# Patient Record
Sex: MALE | Race: WHITE | NOT HISPANIC OR LATINO | Employment: FULL TIME | URBAN - METROPOLITAN AREA
[De-identification: names, ages, dates, MRNs, and addresses within clinical notes are randomized per-mention and may not be internally consistent; named-entity substitution may affect disease eponyms.]

---

## 2024-12-31 ENCOUNTER — HOSPITAL ENCOUNTER (EMERGENCY)
Facility: HOSPITAL | Age: 24
Discharge: HOME | End: 2024-12-31
Attending: EMERGENCY MEDICINE
Payer: COMMERCIAL

## 2024-12-31 ENCOUNTER — APPOINTMENT (OUTPATIENT)
Dept: CARDIOLOGY | Facility: HOSPITAL | Age: 24
End: 2024-12-31
Payer: COMMERCIAL

## 2024-12-31 ENCOUNTER — APPOINTMENT (OUTPATIENT)
Dept: VASCULAR MEDICINE | Facility: HOSPITAL | Age: 24
End: 2024-12-31
Payer: COMMERCIAL

## 2024-12-31 VITALS
HEART RATE: 97 BPM | DIASTOLIC BLOOD PRESSURE: 73 MMHG | HEIGHT: 66 IN | RESPIRATION RATE: 17 BRPM | OXYGEN SATURATION: 96 % | WEIGHT: 190 LBS | BODY MASS INDEX: 30.53 KG/M2 | TEMPERATURE: 98.1 F | SYSTOLIC BLOOD PRESSURE: 118 MMHG

## 2024-12-31 DIAGNOSIS — Z94.1 HISTORY OF HEART TRANSPLANT: ICD-10-CM

## 2024-12-31 DIAGNOSIS — Z95.1 HISTORY OF CORONARY ARTERY BYPASS GRAFT X 1: ICD-10-CM

## 2024-12-31 DIAGNOSIS — R60.0 LOCALIZED EDEMA: ICD-10-CM

## 2024-12-31 DIAGNOSIS — M79.89 LEFT LEG SWELLING: Primary | ICD-10-CM

## 2024-12-31 DIAGNOSIS — N17.9 AKI (ACUTE KIDNEY INJURY) (CMS-HCC): ICD-10-CM

## 2024-12-31 LAB
ALBUMIN SERPL BCP-MCNC: 4.1 G/DL (ref 3.4–5)
ALP SERPL-CCNC: 63 U/L (ref 33–120)
ALT SERPL W P-5'-P-CCNC: 10 U/L (ref 10–52)
ANION GAP SERPL CALC-SCNC: 16 MMOL/L (ref 10–20)
APTT PPP: 35 SECONDS (ref 27–38)
AST SERPL W P-5'-P-CCNC: 13 U/L (ref 9–39)
BASOPHILS # BLD AUTO: 0.03 X10*3/UL (ref 0–0.1)
BASOPHILS NFR BLD AUTO: 0.4 %
BILIRUB SERPL-MCNC: 1.4 MG/DL (ref 0–1.2)
BNP SERPL-MCNC: 504 PG/ML (ref 0–99)
BUN SERPL-MCNC: 42 MG/DL (ref 6–23)
CALCIUM SERPL-MCNC: 9.7 MG/DL (ref 8.6–10.3)
CARDIAC TROPONIN I PNL SERPL HS: 16 NG/L (ref 0–20)
CARDIAC TROPONIN I PNL SERPL HS: 18 NG/L (ref 0–20)
CHLORIDE SERPL-SCNC: 96 MMOL/L (ref 98–107)
CO2 SERPL-SCNC: 31 MMOL/L (ref 21–32)
CREAT SERPL-MCNC: 2.89 MG/DL (ref 0.5–1.3)
EGFRCR SERPLBLD CKD-EPI 2021: 31 ML/MIN/1.73M*2
EOSINOPHIL # BLD AUTO: 0.11 X10*3/UL (ref 0–0.7)
EOSINOPHIL NFR BLD AUTO: 1.6 %
ERYTHROCYTE [DISTWIDTH] IN BLOOD BY AUTOMATED COUNT: 16.4 % (ref 11.5–14.5)
GLUCOSE SERPL-MCNC: 101 MG/DL (ref 74–99)
HCT VFR BLD AUTO: 33 % (ref 41–52)
HGB BLD-MCNC: 10.1 G/DL (ref 13.5–17.5)
IMM GRANULOCYTES # BLD AUTO: 0.02 X10*3/UL (ref 0–0.7)
IMM GRANULOCYTES NFR BLD AUTO: 0.3 % (ref 0–0.9)
INR PPP: 1.4 (ref 0.9–1.1)
LYMPHOCYTES # BLD AUTO: 0.78 X10*3/UL (ref 1.2–4.8)
LYMPHOCYTES NFR BLD AUTO: 11.1 %
MAGNESIUM SERPL-MCNC: 1.63 MG/DL (ref 1.6–2.4)
MCH RBC QN AUTO: 26.4 PG (ref 26–34)
MCHC RBC AUTO-ENTMCNC: 30.6 G/DL (ref 32–36)
MCV RBC AUTO: 86 FL (ref 80–100)
MONOCYTES # BLD AUTO: 0.74 X10*3/UL (ref 0.1–1)
MONOCYTES NFR BLD AUTO: 10.6 %
NEUTROPHILS # BLD AUTO: 5.33 X10*3/UL (ref 1.2–7.7)
NEUTROPHILS NFR BLD AUTO: 76 %
NRBC BLD-RTO: 0 /100 WBCS (ref 0–0)
PLATELET # BLD AUTO: 221 X10*3/UL (ref 150–450)
POTASSIUM SERPL-SCNC: 3.7 MMOL/L (ref 3.5–5.3)
PROT SERPL-MCNC: 6.9 G/DL (ref 6.4–8.2)
PROTHROMBIN TIME: 15.5 SECONDS (ref 9.8–12.8)
RBC # BLD AUTO: 3.83 X10*6/UL (ref 4.5–5.9)
SODIUM SERPL-SCNC: 139 MMOL/L (ref 136–145)
WBC # BLD AUTO: 7 X10*3/UL (ref 4.4–11.3)

## 2024-12-31 PROCEDURE — 83880 ASSAY OF NATRIURETIC PEPTIDE: CPT | Performed by: EMERGENCY MEDICINE

## 2024-12-31 PROCEDURE — 85025 COMPLETE CBC W/AUTO DIFF WBC: CPT | Performed by: EMERGENCY MEDICINE

## 2024-12-31 PROCEDURE — 36415 COLL VENOUS BLD VENIPUNCTURE: CPT | Performed by: EMERGENCY MEDICINE

## 2024-12-31 PROCEDURE — 2500000001 HC RX 250 WO HCPCS SELF ADMINISTERED DRUGS (ALT 637 FOR MEDICARE OP): Performed by: EMERGENCY MEDICINE

## 2024-12-31 PROCEDURE — 93005 ELECTROCARDIOGRAM TRACING: CPT

## 2024-12-31 PROCEDURE — 99285 EMERGENCY DEPT VISIT HI MDM: CPT | Mod: 25 | Performed by: EMERGENCY MEDICINE

## 2024-12-31 PROCEDURE — 93971 EXTREMITY STUDY: CPT | Performed by: INTERNAL MEDICINE

## 2024-12-31 PROCEDURE — 83735 ASSAY OF MAGNESIUM: CPT | Performed by: EMERGENCY MEDICINE

## 2024-12-31 PROCEDURE — 93971 EXTREMITY STUDY: CPT

## 2024-12-31 PROCEDURE — 85730 THROMBOPLASTIN TIME PARTIAL: CPT | Performed by: EMERGENCY MEDICINE

## 2024-12-31 PROCEDURE — 84484 ASSAY OF TROPONIN QUANT: CPT | Performed by: EMERGENCY MEDICINE

## 2024-12-31 PROCEDURE — 85610 PROTHROMBIN TIME: CPT | Performed by: EMERGENCY MEDICINE

## 2024-12-31 PROCEDURE — 80053 COMPREHEN METABOLIC PANEL: CPT | Performed by: EMERGENCY MEDICINE

## 2024-12-31 RX ORDER — NITROGLYCERIN 0.4 MG/1
1 TABLET SUBLINGUAL EVERY 5 MIN PRN
COMMUNITY
Start: 2024-11-18 | End: 2025-11-18

## 2024-12-31 RX ORDER — LANOLIN ALCOHOL/MO/W.PET/CERES
400 CREAM (GRAM) TOPICAL 2 TIMES DAILY
COMMUNITY
Start: 2024-12-26

## 2024-12-31 RX ORDER — MYCOPHENOLIC ACID 180 MG/1
540 TABLET, DELAYED RELEASE ORAL 2 TIMES DAILY
COMMUNITY
Start: 2024-11-10

## 2024-12-31 RX ORDER — PREDNISONE 5 MG/1
5 TABLET ORAL
COMMUNITY
Start: 2024-12-12

## 2024-12-31 RX ORDER — METOPROLOL SUCCINATE 25 MG/1
25 TABLET, EXTENDED RELEASE ORAL
COMMUNITY
Start: 2024-04-18 | End: 2025-04-18

## 2024-12-31 RX ORDER — OXYCODONE HYDROCHLORIDE 5 MG/1
5 TABLET ORAL ONCE
Status: COMPLETED | OUTPATIENT
Start: 2024-12-31 | End: 2024-12-31

## 2024-12-31 RX ORDER — ROSUVASTATIN CALCIUM 10 MG/1
1 TABLET, COATED ORAL NIGHTLY
COMMUNITY
Start: 2024-12-27

## 2024-12-31 RX ORDER — FUROSEMIDE 80 MG/1
80 TABLET ORAL 2 TIMES DAILY
COMMUNITY
Start: 2024-12-29 | End: 2025-01-28

## 2024-12-31 RX ORDER — PANTOPRAZOLE SODIUM 40 MG/1
40 TABLET, DELAYED RELEASE ORAL 2 TIMES DAILY
COMMUNITY
Start: 2023-01-27

## 2024-12-31 RX ORDER — ACETAMINOPHEN 500 MG
1000 TABLET ORAL EVERY 8 HOURS PRN
COMMUNITY

## 2024-12-31 RX ORDER — POTASSIUM CHLORIDE 20 MEQ/1
40 TABLET, EXTENDED RELEASE ORAL
COMMUNITY
Start: 2024-12-12

## 2024-12-31 RX ORDER — SULFAMETHOXAZOLE AND TRIMETHOPRIM 400; 80 MG/1; MG/1
2 TABLET ORAL
COMMUNITY
Start: 2024-02-26

## 2024-12-31 RX ORDER — FUROSEMIDE 40 MG/1
40 TABLET ORAL 2 TIMES DAILY
Qty: 30 TABLET | Refills: 0 | Status: SHIPPED | OUTPATIENT
Start: 2024-12-31 | End: 2025-01-15

## 2024-12-31 RX ORDER — NAPROXEN SODIUM 220 MG/1
81 TABLET, FILM COATED ORAL
COMMUNITY
Start: 2024-07-23

## 2024-12-31 RX ORDER — TACROLIMUS 1 MG/1
2 CAPSULE ORAL 2 TIMES DAILY
COMMUNITY
Start: 2024-12-26

## 2024-12-31 RX ADMIN — OXYCODONE HYDROCHLORIDE 5 MG: 5 TABLET ORAL at 11:54

## 2024-12-31 ASSESSMENT — PAIN SCALES - GENERAL: PAINLEVEL_OUTOF10: 4

## 2024-12-31 ASSESSMENT — PAIN DESCRIPTION - LOCATION: LOCATION: CHEST

## 2024-12-31 ASSESSMENT — PAIN - FUNCTIONAL ASSESSMENT: PAIN_FUNCTIONAL_ASSESSMENT: 0-10

## 2024-12-31 NOTE — ED TRIAGE NOTES
Pt presents to ED via EMS from home for left lower leg swelling. Pt has extensive cardiac surgical history including a bypass last month.

## 2024-12-31 NOTE — DISCHARGE INSTRUCTIONS
You were seen in the ED for worsening left leg swelling after recent cardiac surgery.  Ultrasound did not show any blood clot in your leg.  Labs showed mildly worsened kidney function.  Please cut your lasix dose to 40mg twice a day and follow up within 1 week with cardiac transplant/heart failure center and with your PCP.  You may benefit from repeat ultrasound in 1 week if your leg swelling does not improve.  Call your transplant team and/or return to the ED if you develop chest pain, trouble breathing or other concerns.

## 2024-12-31 NOTE — ED PROVIDER NOTES
HPI   Chief Complaint   Patient presents with    Leg Swelling       This is a 24yM complex cardiac history (congenital aortic stenosis s/p Ross procedure 2003 followed by CHF with pulm HTN requiring orthotopic heart transplant 2009 and recent redo CABG (LIMA-LAD) 11/26 with redo of anastamosis 11/27 complicated by low cardiac output requiring milrinone (weaned off) and fluid overload on IV lasix (discharged on 12/12 at patient request even though not fully diuresed).  Cardiologist Dr. Estevez.  He is awaiting follow up US and [facility] surgery consult for possible pseudoaneurysm and/or AV fistula of R CFA.  At his follow up with the transplant/heart failure team, he endorsed incisional chest pain.  He continues to note that pain and says he completed the prescribed course of norco and tylenol is insufficient.  Mostly, he is worried about his leg swelling.  He says his b/l leg swelling has improved in his RLE but his LLE is still swollen and has even worsened and it is now painful to walk.  No SOB, fever, cough.      History provided by:  Patient and medical records   used: No            Patient History   Past Medical History:   Diagnosis Date    Acute pharyngitis, unspecified 05/08/2017    Acute viral pharyngitis    Acute upper respiratory infection, unspecified 11/06/2017    Acute upper respiratory infection    Noninfective gastroenteritis and colitis, unspecified 02/15/2017    Gastroenteritis, acute    Personal history of other (healed) physical injury and trauma 08/11/2014    History of sprain of ankle    Personal history of other diseases of the respiratory system 11/09/2017    History of acute pharyngitis    Personal history of other diseases of the respiratory system 11/06/2017    History of sore throat    Personal history of other diseases of the respiratory system 09/29/2016    History of acute bronchitis    Personal history of other diseases of the respiratory system 03/17/2014    History of  streptococcal pharyngitis    Personal history of other specified conditions 07/02/2013    History of diarrhea    Personal history of other specified conditions 05/11/2017    History of nasal congestion    Plantar wart 07/13/2016    Plantar warts     No past surgical history on file.  No family history on file.  Social History     Tobacco Use    Smoking status: Not on file    Smokeless tobacco: Not on file   Substance Use Topics    Alcohol use: Not on file    Drug use: Not on file       Physical Exam   ED Triage Vitals [12/31/24 1114]   Temperature Heart Rate Respirations BP   36.7 °C (98.1 °F) 94 20 131/75      Pulse Ox Temp Source Heart Rate Source Patient Position   99 % Temporal Monitor Sitting      BP Location FiO2 (%)     Right arm --       Physical Exam  Vitals and nursing note reviewed.   Constitutional:       General: He is not in acute distress.     Appearance: He is not ill-appearing, toxic-appearing or diaphoretic.   HENT:      Head: Normocephalic and atraumatic.      Nose: Nose normal. No congestion or rhinorrhea.      Mouth/Throat:      Mouth: Mucous membranes are moist.   Eyes:      General: No scleral icterus.        Right eye: No discharge.         Left eye: No discharge.      Extraocular Movements: Extraocular movements intact.      Conjunctiva/sclera: Conjunctivae normal.   Cardiovascular:      Rate and Rhythm: Normal rate and regular rhythm.      Heart sounds: No murmur heard.     No friction rub. No gallop.      Comments: Well healing midline surgical incision  Pulmonary:      Effort: Pulmonary effort is normal. No respiratory distress.      Breath sounds: Normal breath sounds. No stridor. No wheezing, rhonchi or rales.   Abdominal:      Palpations: Abdomen is soft.      Tenderness: There is no abdominal tenderness. There is no guarding or rebound.   Musculoskeletal:         General: Swelling and tenderness present. No deformity or signs of injury. Normal range of motion.      Cervical back:  Normal range of motion and neck supple. No rigidity.      Left lower leg: Edema present.   Skin:     General: Skin is dry.      Coloration: Skin is not jaundiced or pale.      Findings: No bruising, erythema, lesion or rash.   Neurological:      General: No focal deficit present.      Mental Status: He is alert and oriented to person, place, and time.      Cranial Nerves: No cranial nerve deficit.      Sensory: No sensory deficit.      Motor: No weakness.   Psychiatric:         Mood and Affect: Mood normal.         Behavior: Behavior normal.           ED Course & MDM   ED Course as of 12/31/24 1454   Tue Dec 31, 2024   1231 EKG interpreted by me normal sinus rhythm normal axis otherwise normal intervals QTc 413 anterior T wave inversions similar to prior but new lateral and inferior T wave inversions compared to prior [EK]   1238 Labs reviewed, no leukocytosis, mild anemia hemoglobin 10.1, grossly normal electrolytes, CKD creatinine 2.89 up from recent 2.5-2.6 postop, mildly elevated  negative troponin 18 [EK]   1444 Spoke to [facility] cardiac transplant team - ok to dc given no acute issue, consider cutting dose of lasix now that he is back to his dry weight and has ASHA, recommend follow up with [facility] transplant center and PCP. [EK]      ED Course User Index  [EK] Elyse H Klerman, MD         Diagnoses as of 12/31/24 1454   Left leg swelling   History of heart transplant   History of coronary artery bypass graft x 1   ASHA (acute kidney injury) ([facility])                 No data recorded     Arturo Coma Scale Score: 15 (12/31/24 1115 : Johana Palma RN)                           Medical Decision Making  24yM complex cardiac history presents with worsening LLE swelling after recent CABG.  Pt nontoxic apperaing, hemodynamically stable and with no respiratory distress, hypoxia, tachypnea or inc WOB.  EKG with near diffuse TWI.  Labs with mild ASHA.  US without DVT in LLE.  Discussed with transplant team - recommend  outpatient surveillance with close follow up without need for admission and agree with cutting his lasix dose in half given return to his dry weight and ASHA.  Plan reviewed with pt and father at bedside who are agreeable to this plan.  Ok to dc with return precautions.    Amount and/or Complexity of Data Reviewed  Independent Historian: parent  External Data Reviewed: labs, ECG and notes.  Labs: ordered. Decision-making details documented in ED Course.  Radiology: ordered.  ECG/medicine tests: ordered and independent interpretation performed. Decision-making details documented in ED Course.    Risk  Prescription drug management.  Decision regarding hospitalization.        Procedure  Procedures     Elyse H Klerman, MD  12/31/24 5120

## 2025-01-03 LAB
ATRIAL RATE: 94 BPM
P AXIS: 56 DEGREES
PR INTERVAL: 173 MS
Q ONSET: 252 MS
QRS COUNT: 15 BEATS
QRS DURATION: 97 MS
QT INTERVAL: 330 MS
QTC CALCULATION(BAZETT): 413 MS
QTC FREDERICIA: 383 MS
R AXIS: 30 DEGREES
T AXIS: 196 DEGREES
T OFFSET: 417 MS
VENTRICULAR RATE: 94 BPM

## 2025-04-26 ENCOUNTER — HOSPITAL ENCOUNTER (EMERGENCY)
Facility: HOSPITAL | Age: 25
Discharge: ANOTHER HOSPITAL | End: 2025-04-27
Attending: EMERGENCY MEDICINE
Payer: COMMERCIAL

## 2025-04-26 ENCOUNTER — APPOINTMENT (OUTPATIENT)
Dept: RADIOLOGY | Facility: HOSPITAL | Age: 25
End: 2025-04-26
Payer: COMMERCIAL

## 2025-04-26 ENCOUNTER — HOSPITAL ENCOUNTER (OUTPATIENT)
Dept: CARDIOLOGY | Facility: HOSPITAL | Age: 25
Discharge: HOME | End: 2025-04-26
Payer: COMMERCIAL

## 2025-04-26 DIAGNOSIS — R07.9 CHEST PAIN: ICD-10-CM

## 2025-04-26 DIAGNOSIS — R07.9 CHEST PAIN, UNSPECIFIED TYPE: Primary | ICD-10-CM

## 2025-04-26 LAB
ANION GAP SERPL CALC-SCNC: 14 MMOL/L (ref 10–20)
BASOPHILS # BLD AUTO: 0.01 X10*3/UL (ref 0–0.1)
BASOPHILS NFR BLD AUTO: 0.1 %
BNP SERPL-MCNC: 248 PG/ML (ref 0–99)
BUN SERPL-MCNC: 26 MG/DL (ref 6–23)
CALCIUM SERPL-MCNC: 9.2 MG/DL (ref 8.6–10.3)
CARDIAC TROPONIN I PNL SERPL HS: 9 NG/L (ref 0–20)
CHLORIDE SERPL-SCNC: 101 MMOL/L (ref 98–107)
CO2 SERPL-SCNC: 26 MMOL/L (ref 21–32)
CREAT SERPL-MCNC: 2.51 MG/DL (ref 0.5–1.3)
EGFRCR SERPLBLD CKD-EPI 2021: 36 ML/MIN/1.73M*2
EOSINOPHIL # BLD AUTO: 0.1 X10*3/UL (ref 0–0.7)
EOSINOPHIL NFR BLD AUTO: 1.4 %
ERYTHROCYTE [DISTWIDTH] IN BLOOD BY AUTOMATED COUNT: 22.4 % (ref 11.5–14.5)
GLUCOSE SERPL-MCNC: 115 MG/DL (ref 74–99)
HCT VFR BLD AUTO: 33.2 % (ref 41–52)
HGB BLD-MCNC: 9.5 G/DL (ref 13.5–17.5)
HYPOCHROMIA BLD QL SMEAR: NORMAL
IMM GRANULOCYTES # BLD AUTO: 0.02 X10*3/UL (ref 0–0.7)
IMM GRANULOCYTES NFR BLD AUTO: 0.3 % (ref 0–0.9)
LYMPHOCYTES # BLD AUTO: 0.66 X10*3/UL (ref 1.2–4.8)
LYMPHOCYTES NFR BLD AUTO: 9.5 %
MAGNESIUM SERPL-MCNC: 1.68 MG/DL (ref 1.6–2.4)
MCH RBC QN AUTO: 22.5 PG (ref 26–34)
MCHC RBC AUTO-ENTMCNC: 28.6 G/DL (ref 32–36)
MCV RBC AUTO: 79 FL (ref 80–100)
MONOCYTES # BLD AUTO: 0.67 X10*3/UL (ref 0.1–1)
MONOCYTES NFR BLD AUTO: 9.6 %
NEUTROPHILS # BLD AUTO: 5.51 X10*3/UL (ref 1.2–7.7)
NEUTROPHILS NFR BLD AUTO: 79.1 %
NRBC BLD-RTO: 0 /100 WBCS (ref 0–0)
OVALOCYTES BLD QL SMEAR: NORMAL
PLATELET # BLD AUTO: 147 X10*3/UL (ref 150–450)
POLYCHROMASIA BLD QL SMEAR: NORMAL
POTASSIUM SERPL-SCNC: 4.4 MMOL/L (ref 3.5–5.3)
RBC # BLD AUTO: 4.23 X10*6/UL (ref 4.5–5.9)
RBC MORPH BLD: NORMAL
SODIUM SERPL-SCNC: 137 MMOL/L (ref 136–145)
WBC # BLD AUTO: 7 X10*3/UL (ref 4.4–11.3)

## 2025-04-26 PROCEDURE — 71046 X-RAY EXAM CHEST 2 VIEWS: CPT

## 2025-04-26 PROCEDURE — 83880 ASSAY OF NATRIURETIC PEPTIDE: CPT | Performed by: EMERGENCY MEDICINE

## 2025-04-26 PROCEDURE — 85025 COMPLETE CBC W/AUTO DIFF WBC: CPT | Performed by: EMERGENCY MEDICINE

## 2025-04-26 PROCEDURE — 83735 ASSAY OF MAGNESIUM: CPT | Performed by: EMERGENCY MEDICINE

## 2025-04-26 PROCEDURE — 84484 ASSAY OF TROPONIN QUANT: CPT | Performed by: EMERGENCY MEDICINE

## 2025-04-26 PROCEDURE — 96374 THER/PROPH/DIAG INJ IV PUSH: CPT | Mod: 59

## 2025-04-26 PROCEDURE — 2500000004 HC RX 250 GENERAL PHARMACY W/ HCPCS (ALT 636 FOR OP/ED): Mod: JZ | Performed by: EMERGENCY MEDICINE

## 2025-04-26 PROCEDURE — 36415 COLL VENOUS BLD VENIPUNCTURE: CPT | Performed by: EMERGENCY MEDICINE

## 2025-04-26 PROCEDURE — 71046 X-RAY EXAM CHEST 2 VIEWS: CPT | Performed by: RADIOLOGY

## 2025-04-26 PROCEDURE — 71275 CT ANGIOGRAPHY CHEST: CPT

## 2025-04-26 PROCEDURE — 99285 EMERGENCY DEPT VISIT HI MDM: CPT | Mod: 25 | Performed by: EMERGENCY MEDICINE

## 2025-04-26 PROCEDURE — 80048 BASIC METABOLIC PNL TOTAL CA: CPT | Performed by: EMERGENCY MEDICINE

## 2025-04-26 PROCEDURE — 74174 CTA ABD&PLVS W/CONTRAST: CPT

## 2025-04-26 RX ORDER — MORPHINE SULFATE 4 MG/ML
4 INJECTION, SOLUTION INTRAMUSCULAR; INTRAVENOUS ONCE
Status: COMPLETED | OUTPATIENT
Start: 2025-04-26 | End: 2025-04-26

## 2025-04-26 RX ADMIN — MORPHINE SULFATE 4 MG: 4 INJECTION, SOLUTION INTRAMUSCULAR; INTRAVENOUS at 22:30

## 2025-04-26 ASSESSMENT — COLUMBIA-SUICIDE SEVERITY RATING SCALE - C-SSRS
6. HAVE YOU EVER DONE ANYTHING, STARTED TO DO ANYTHING, OR PREPARED TO DO ANYTHING TO END YOUR LIFE?: NO
2. HAVE YOU ACTUALLY HAD ANY THOUGHTS OF KILLING YOURSELF?: NO
1. IN THE PAST MONTH, HAVE YOU WISHED YOU WERE DEAD OR WISHED YOU COULD GO TO SLEEP AND NOT WAKE UP?: NO

## 2025-04-26 ASSESSMENT — PAIN - FUNCTIONAL ASSESSMENT: PAIN_FUNCTIONAL_ASSESSMENT: 0-10

## 2025-04-26 ASSESSMENT — PAIN SCALES - GENERAL
PAINLEVEL_OUTOF10: 4
PAINLEVEL_OUTOF10: 4

## 2025-04-26 ASSESSMENT — PAIN DESCRIPTION - PROGRESSION: CLINICAL_PROGRESSION: RAPIDLY IMPROVING

## 2025-04-26 ASSESSMENT — PAIN DESCRIPTION - LOCATION: LOCATION: CHEST

## 2025-04-27 VITALS
BODY MASS INDEX: 28.12 KG/M2 | TEMPERATURE: 98.8 F | OXYGEN SATURATION: 94 % | RESPIRATION RATE: 24 BRPM | SYSTOLIC BLOOD PRESSURE: 134 MMHG | HEART RATE: 94 BPM | WEIGHT: 175 LBS | DIASTOLIC BLOOD PRESSURE: 68 MMHG | HEIGHT: 66 IN

## 2025-04-27 LAB
CARDIAC TROPONIN I PNL SERPL HS: 8 NG/L (ref 0–20)
LACTATE SERPL-SCNC: 1 MMOL/L (ref 0.4–2)
TACROLIMUS BLD-MCNC: 6.2 NG/ML

## 2025-04-27 PROCEDURE — 96375 TX/PRO/DX INJ NEW DRUG ADDON: CPT

## 2025-04-27 PROCEDURE — 2550000001 HC RX 255 CONTRASTS: Performed by: EMERGENCY MEDICINE

## 2025-04-27 PROCEDURE — 74174 CTA ABD&PLVS W/CONTRAST: CPT | Performed by: SURGERY

## 2025-04-27 PROCEDURE — 96376 TX/PRO/DX INJ SAME DRUG ADON: CPT

## 2025-04-27 PROCEDURE — 2500000004 HC RX 250 GENERAL PHARMACY W/ HCPCS (ALT 636 FOR OP/ED): Mod: JZ | Performed by: STUDENT IN AN ORGANIZED HEALTH CARE EDUCATION/TRAINING PROGRAM

## 2025-04-27 PROCEDURE — 83605 ASSAY OF LACTIC ACID: CPT | Performed by: EMERGENCY MEDICINE

## 2025-04-27 PROCEDURE — 2500000005 HC RX 250 GENERAL PHARMACY W/O HCPCS: Performed by: EMERGENCY MEDICINE

## 2025-04-27 PROCEDURE — 71275 CT ANGIOGRAPHY CHEST: CPT | Performed by: SURGERY

## 2025-04-27 PROCEDURE — 2500000001 HC RX 250 WO HCPCS SELF ADMINISTERED DRUGS (ALT 637 FOR MEDICARE OP): Performed by: EMERGENCY MEDICINE

## 2025-04-27 PROCEDURE — 36415 COLL VENOUS BLD VENIPUNCTURE: CPT | Performed by: EMERGENCY MEDICINE

## 2025-04-27 PROCEDURE — 80197 ASSAY OF TACROLIMUS: CPT | Mod: PARLAB | Performed by: EMERGENCY MEDICINE

## 2025-04-27 PROCEDURE — 2500000004 HC RX 250 GENERAL PHARMACY W/ HCPCS (ALT 636 FOR OP/ED): Performed by: EMERGENCY MEDICINE

## 2025-04-27 RX ORDER — PANTOPRAZOLE SODIUM 40 MG/1
40 TABLET, DELAYED RELEASE ORAL
Status: DISCONTINUED | OUTPATIENT
Start: 2025-04-27 | End: 2025-04-27 | Stop reason: HOSPADM

## 2025-04-27 RX ORDER — METOPROLOL SUCCINATE 25 MG/1
25 TABLET, EXTENDED RELEASE ORAL DAILY
Status: DISCONTINUED | OUTPATIENT
Start: 2025-04-27 | End: 2025-04-27 | Stop reason: HOSPADM

## 2025-04-27 RX ORDER — PREDNISONE 10 MG/1
5 TABLET ORAL DAILY
Status: DISCONTINUED | OUTPATIENT
Start: 2025-04-27 | End: 2025-04-27 | Stop reason: HOSPADM

## 2025-04-27 RX ORDER — ALLOPURINOL 100 MG/1
100 TABLET ORAL DAILY
Status: DISCONTINUED | OUTPATIENT
Start: 2025-04-27 | End: 2025-04-27 | Stop reason: HOSPADM

## 2025-04-27 RX ORDER — ROSUVASTATIN CALCIUM 10 MG/1
10 TABLET, COATED ORAL DAILY
Status: DISCONTINUED | OUTPATIENT
Start: 2025-04-27 | End: 2025-04-27 | Stop reason: HOSPADM

## 2025-04-27 RX ORDER — LIDOCAINE 560 MG/1
2 PATCH PERCUTANEOUS; TOPICAL; TRANSDERMAL ONCE
Status: DISCONTINUED | OUTPATIENT
Start: 2025-04-27 | End: 2025-04-27 | Stop reason: HOSPADM

## 2025-04-27 RX ORDER — MORPHINE SULFATE 4 MG/ML
4 INJECTION, SOLUTION INTRAMUSCULAR; INTRAVENOUS ONCE
Status: COMPLETED | OUTPATIENT
Start: 2025-04-27 | End: 2025-04-27

## 2025-04-27 RX ORDER — TORSEMIDE 20 MG/1
100 TABLET ORAL ONCE
Status: COMPLETED | OUTPATIENT
Start: 2025-04-27 | End: 2025-04-27

## 2025-04-27 RX ORDER — CYCLOBENZAPRINE HCL 10 MG
10 TABLET ORAL ONCE
Status: COMPLETED | OUTPATIENT
Start: 2025-04-27 | End: 2025-04-27

## 2025-04-27 RX ORDER — DULOXETIN HYDROCHLORIDE 20 MG/1
40 CAPSULE, DELAYED RELEASE ORAL DAILY
Status: DISCONTINUED | OUTPATIENT
Start: 2025-04-27 | End: 2025-04-27 | Stop reason: HOSPADM

## 2025-04-27 RX ORDER — MYCOPHENOLIC ACID 180 MG/1
180 TABLET, DELAYED RELEASE ORAL 2 TIMES DAILY
Status: DISCONTINUED | OUTPATIENT
Start: 2025-04-27 | End: 2025-04-27 | Stop reason: HOSPADM

## 2025-04-27 RX ORDER — NAPROXEN SODIUM 220 MG/1
81 TABLET, FILM COATED ORAL ONCE
Status: COMPLETED | OUTPATIENT
Start: 2025-04-27 | End: 2025-04-27

## 2025-04-27 RX ADMIN — TORSEMIDE 100 MG: 20 TABLET ORAL at 08:42

## 2025-04-27 RX ADMIN — DULOXETINE HYDROCHLORIDE 40 MG: 20 CAPSULE, DELAYED RELEASE ORAL at 08:42

## 2025-04-27 RX ADMIN — TACROLIMUS 1.5 MG: 1 CAPSULE ORAL at 06:26

## 2025-04-27 RX ADMIN — HYDROMORPHONE HYDROCHLORIDE 0.5 MG: 1 INJECTION, SOLUTION INTRAMUSCULAR; INTRAVENOUS; SUBCUTANEOUS at 08:42

## 2025-04-27 RX ADMIN — MYCOPHENOLIC ACID 180 MG: 180 TABLET, DELAYED RELEASE ORAL at 08:42

## 2025-04-27 RX ADMIN — LIDOCAINE 2 PATCH: 4 PATCH TOPICAL at 00:40

## 2025-04-27 RX ADMIN — ASPIRIN 81 MG CHEWABLE TABLET 81 MG: 81 TABLET CHEWABLE at 08:41

## 2025-04-27 RX ADMIN — CYCLOBENZAPRINE 10 MG: 10 TABLET, FILM COATED ORAL at 00:40

## 2025-04-27 RX ADMIN — METOPROLOL SUCCINATE 25 MG: 25 TABLET, EXTENDED RELEASE ORAL at 08:41

## 2025-04-27 RX ADMIN — ALLOPURINOL 100 MG: 100 TABLET ORAL at 08:42

## 2025-04-27 RX ADMIN — PANTOPRAZOLE SODIUM 40 MG: 40 TABLET, DELAYED RELEASE ORAL at 06:26

## 2025-04-27 RX ADMIN — MORPHINE SULFATE 4 MG: 4 INJECTION, SOLUTION INTRAMUSCULAR; INTRAVENOUS at 03:28

## 2025-04-27 RX ADMIN — IOHEXOL 100 ML: 350 INJECTION, SOLUTION INTRAVENOUS at 00:25

## 2025-04-27 RX ADMIN — PREDNISONE 5 MG: 10 TABLET ORAL at 08:41

## 2025-04-27 ASSESSMENT — PAIN DESCRIPTION - LOCATION: LOCATION: SHOULDER

## 2025-04-27 ASSESSMENT — PAIN DESCRIPTION - ORIENTATION: ORIENTATION: RIGHT

## 2025-04-27 ASSESSMENT — PAIN SCALES - GENERAL
PAINLEVEL_OUTOF10: 4
PAINLEVEL_OUTOF10: 10 - WORST POSSIBLE PAIN
PAINLEVEL_OUTOF10: 10 - WORST POSSIBLE PAIN

## 2025-04-27 ASSESSMENT — PAIN - FUNCTIONAL ASSESSMENT
PAIN_FUNCTIONAL_ASSESSMENT: 0-10

## 2025-04-27 NOTE — ED TRIAGE NOTES
Pt comes to the ED via EMS from home with complaints of chest pain. He does have a history of a heart transplant as well as cardiac stent x2 and bypass surgery.

## 2025-04-27 NOTE — ED PROVIDER NOTES
External Records Reviewed: Discharge summary from the [facility] and when she was submitted for initiation of dialysis in the setting of acute kidney injury on CKD    HPI  Jerzy Richard is a 24 y.o. male with a history of orthotopic heart transplant in 2006, coronary artery disease status post CABG in 2024 and PCI, end-stage renal disease on intermittent hemodialysis (MWF) presenting to the emergency department chest pain.  Patient reports that he developed chest pain, right shoulder, right back pain earlier today.  There is no inciting event.  He reports some shortness of breath and nausea.  He denies any vomiting.  He denies any falls or recent trauma.    [facility]  Medical History[1]    Meds  Current Outpatient Medications   Medication Instructions    acetaminophen (TYLENOL) 1,000 mg, oral, Every 8 hours PRN    aspirin 81 mg, oral, Daily RT    evolocumab (REPATHA SURECLICK) 140 mg, subcutaneous, Every 14 days    furosemide (LASIX) 80 mg, oral, 2 times daily    furosemide (LASIX) 40 mg, oral, 2 times daily    magnesium oxide (MAG-OX) 400 mg, oral, 2 times daily    metoprolol succinate XL (TOPROL-XL) 25 mg, oral, Daily RT    mycophenolate (MYFORTIC) 540 mg, oral, 2 times daily    nitroglycerin (Nitrostat) 0.4 mg SL tablet 1 tablet, sublingual, Every 5 min PRN,  Up to 3 doses. If no pain relief call 911.    pantoprazole (PROTONIX) 40 mg, oral, 2 times daily    potassium chloride CR 20 mEq ER tablet 40 mEq, oral, Daily RT    predniSONE (DELTASONE) 5 mg, oral, Daily RT    rosuvastatin (Crestor) 10 mg tablet 1 tablet, oral, Nightly    sulfamethoxazole-trimethoprim (Bactrim) 400-80 mg tablet 2 tablets, oral, Every Mon/Wed/Fri    tacrolimus (PROGRAF) 2 mg, oral, 2 times daily       Allergies  RX Allergies[2]     SHx  Social History[3]    ------------------------------------------------------------------------------------------------------------------------------------------    /69   Pulse 94   Temp 37.1 °C (98.8  "°F)   Resp 13   Ht 1.676 m (5' 6\")   Wt 79.4 kg (175 lb)   SpO2 95%   BMI 28.25 kg/m²     Physical Exam  Vitals and nursing note reviewed.   Constitutional:       General: He is not in acute distress.     Appearance: Normal appearance.   HENT:      Head: Normocephalic and atraumatic.      Right Ear: External ear normal.      Left Ear: External ear normal.   Eyes:      Extraocular Movements: Extraocular movements intact.      Conjunctiva/sclera: Conjunctivae normal.   Cardiovascular:      Rate and Rhythm: Normal rate and regular rhythm.      Pulses: Normal pulses.      Heart sounds: Normal heart sounds. No murmur heard.     No friction rub. No gallop.   Pulmonary:      Effort: Pulmonary effort is normal. No respiratory distress.      Breath sounds: No wheezing, rhonchi or rales.   Chest:      Comments: Well healed sternotomy incision  Abdominal:      General: There is no distension.      Palpations: Abdomen is soft.      Tenderness: There is no abdominal tenderness. There is no guarding or rebound.   Musculoskeletal:         General: No swelling. Normal range of motion.      Cervical back: Neck supple.      Right lower leg: No edema.      Left lower leg: No edema.   Skin:     General: Skin is warm and dry.   Neurological:      General: No focal deficit present.      Mental Status: He is alert and oriented to person, place, and time.   Psychiatric:         Mood and Affect: Mood normal.          ------------------------------------------------------------------------------------------------------------------------------------------    Medical Decision Making: This is a 24-year-old male with a complicated past medical history presenting to the emergency department concern for chest and back pain.  His vital signs are normal and stable.  On examination he has a normal cardiopulmonary exam.  At this time, the etiology of his symptoms is not entirely clear his EKG shows no acute change concerning for ischemia and his " troponins are stable so low concern for acute coronary syndrome.  His basic metabolic panel reveals a stable creatinine at 2.51.  He has no significant leukocytosis and his chest x-ray shows no evidence of pneumonia so have low concern for infectious etiologies of his symptoms.  He does have a chronic anemia with a hemoglobin of 9.5.  Given the pain of his chest, back, and flank, CT angiogram was performed of his chest, abdomen, pelvis.  This revealed no evidence of acute aortic pathology.  Additionally, there is no evidence of pneumothorax.  There was concern for potential mesenteric ischemia, however the patient has no abdominal pain and his lactate is 1 without any leukocytosis so I have very low concern that this is clinically present.  Given that he has a very complicated history including coronary disease with persistent chest pain, I did discuss the patient's case with the on-call cardiologist who accepted him for transfer to the [facility].  He will be monitored in our emergency department until he can be moved to his new treatment location.  While in the emergency department, he was given lidocaine, morphine, and Flexeril for his pain with mild improvement of his symptoms.      Independent Interpretations: Chest x-ray shows no pneumothorax or pneumonia    EKG interpreted by me:  ED Course as of 04/27/25 0314   Sat Apr 26, 2025 2233 EKG:  Rate 89  NSR  Normal axis  Normal intervals  Anterior TWI, similar to prior [AG]      ED Course User Index  [AG] Sterling Mckay MD         Diagnoses as of 04/27/25 0314   Chest pain, unspecified type       Discussed with: [facility] cardiology      Sterling Mckay MD  Emergency Medicine Attending       Sterling Mckay MD  04/27/25 0331         [1]   Past Medical History:  Diagnosis Date    Acute pharyngitis, unspecified 05/08/2017    Acute viral pharyngitis    Acute upper respiratory infection, unspecified 11/06/2017    Acute upper respiratory infection     Noninfective gastroenteritis and colitis, unspecified 02/15/2017    Gastroenteritis, acute    Personal history of other (healed) physical injury and trauma 08/11/2014    History of sprain of ankle    Personal history of other diseases of the respiratory system 11/09/2017    History of acute pharyngitis    Personal history of other diseases of the respiratory system 11/06/2017    History of sore throat    Personal history of other diseases of the respiratory system 09/29/2016    History of acute bronchitis    Personal history of other diseases of the respiratory system 03/17/2014    History of streptococcal pharyngitis    Personal history of other specified conditions 07/02/2013    History of diarrhea    Personal history of other specified conditions 05/11/2017    History of nasal congestion    Plantar wart 07/13/2016    Plantar warts   [2]   Allergies  Allergen Reactions    Grapefruit Other     Increases Prograph levels    Ketamine Other     Prolonged effect, >24hrs   [3]         Sterling Mckay MD  04/27/25 7403

## 2025-04-30 PROCEDURE — 93005 ELECTROCARDIOGRAM TRACING: CPT

## 2025-05-02 LAB
ATRIAL RATE: 89 BPM
P AXIS: 74 DEGREES
PR INTERVAL: 113 MS
Q ONSET: 254 MS
QRS COUNT: 14 BEATS
QRS DURATION: 93 MS
QT INTERVAL: 342 MS
QTC CALCULATION(BAZETT): 417 MS
QTC FREDERICIA: 389 MS
R AXIS: 69 DEGREES
T AXIS: 188 DEGREES
T OFFSET: 425 MS
VENTRICULAR RATE: 89 BPM

## 2025-05-03 ENCOUNTER — APPOINTMENT (OUTPATIENT)
Dept: CARDIOLOGY | Facility: HOSPITAL | Age: 25
End: 2025-05-03
Payer: COMMERCIAL

## 2025-05-03 ENCOUNTER — APPOINTMENT (OUTPATIENT)
Dept: RADIOLOGY | Facility: HOSPITAL | Age: 25
End: 2025-05-03
Payer: COMMERCIAL

## 2025-05-03 ENCOUNTER — HOSPITAL ENCOUNTER (EMERGENCY)
Facility: HOSPITAL | Age: 25
Discharge: HOME | End: 2025-05-03
Attending: STUDENT IN AN ORGANIZED HEALTH CARE EDUCATION/TRAINING PROGRAM
Payer: COMMERCIAL

## 2025-05-03 VITALS
RESPIRATION RATE: 18 BRPM | BODY MASS INDEX: 26.52 KG/M2 | HEART RATE: 87 BPM | DIASTOLIC BLOOD PRESSURE: 68 MMHG | WEIGHT: 175 LBS | HEIGHT: 68 IN | SYSTOLIC BLOOD PRESSURE: 127 MMHG | TEMPERATURE: 97.7 F | OXYGEN SATURATION: 97 %

## 2025-05-03 DIAGNOSIS — T82.49XA OTHER COMPLICATION OF VASCULAR DIALYSIS CATHETER, INITIAL ENCOUNTER: ICD-10-CM

## 2025-05-03 DIAGNOSIS — R07.89 RIGHT-SIDED CHEST WALL PAIN: Primary | ICD-10-CM

## 2025-05-03 LAB
ALBUMIN SERPL BCP-MCNC: 4.7 G/DL (ref 3.4–5)
ALP SERPL-CCNC: 102 U/L (ref 33–120)
ALT SERPL W P-5'-P-CCNC: 12 U/L (ref 10–52)
ANION GAP SERPL CALC-SCNC: 16 MMOL/L (ref 10–20)
AST SERPL W P-5'-P-CCNC: 18 U/L (ref 9–39)
BASOPHILS # BLD AUTO: 0.03 X10*3/UL (ref 0–0.1)
BASOPHILS NFR BLD AUTO: 0.4 %
BILIRUB SERPL-MCNC: 2 MG/DL (ref 0–1.2)
BNP SERPL-MCNC: 313 PG/ML (ref 0–99)
BUN SERPL-MCNC: 15 MG/DL (ref 6–23)
CALCIUM SERPL-MCNC: 10 MG/DL (ref 8.6–10.3)
CARDIAC TROPONIN I PNL SERPL HS: 8 NG/L (ref 0–20)
CARDIAC TROPONIN I PNL SERPL HS: 9 NG/L (ref 0–20)
CHLORIDE SERPL-SCNC: 100 MMOL/L (ref 98–107)
CO2 SERPL-SCNC: 27 MMOL/L (ref 21–32)
CREAT SERPL-MCNC: 1.86 MG/DL (ref 0.5–1.3)
DACRYOCYTES BLD QL SMEAR: NORMAL
EGFRCR SERPLBLD CKD-EPI 2021: 52 ML/MIN/1.73M*2
EOSINOPHIL # BLD AUTO: 0.15 X10*3/UL (ref 0–0.7)
EOSINOPHIL NFR BLD AUTO: 2 %
ERYTHROCYTE [DISTWIDTH] IN BLOOD BY AUTOMATED COUNT: 21.8 % (ref 11.5–14.5)
GLUCOSE SERPL-MCNC: 91 MG/DL (ref 74–99)
HCT VFR BLD AUTO: 38 % (ref 41–52)
HGB BLD-MCNC: 11.2 G/DL (ref 13.5–17.5)
IMM GRANULOCYTES # BLD AUTO: 0.05 X10*3/UL (ref 0–0.7)
IMM GRANULOCYTES NFR BLD AUTO: 0.7 % (ref 0–0.9)
LYMPHOCYTES # BLD AUTO: 0.47 X10*3/UL (ref 1.2–4.8)
LYMPHOCYTES NFR BLD AUTO: 6.1 %
MAGNESIUM SERPL-MCNC: 1.66 MG/DL (ref 1.6–2.4)
MCH RBC QN AUTO: 23.1 PG (ref 26–34)
MCHC RBC AUTO-ENTMCNC: 29.5 G/DL (ref 32–36)
MCV RBC AUTO: 78 FL (ref 80–100)
MONOCYTES # BLD AUTO: 0.63 X10*3/UL (ref 0.1–1)
MONOCYTES NFR BLD AUTO: 8.2 %
NEUTROPHILS # BLD AUTO: 6.36 X10*3/UL (ref 1.2–7.7)
NEUTROPHILS NFR BLD AUTO: 82.6 %
NRBC BLD-RTO: 0 /100 WBCS (ref 0–0)
OVALOCYTES BLD QL SMEAR: NORMAL
PLATELET # BLD AUTO: 174 X10*3/UL (ref 150–450)
POLYCHROMASIA BLD QL SMEAR: NORMAL
POTASSIUM SERPL-SCNC: 4.3 MMOL/L (ref 3.5–5.3)
PROT SERPL-MCNC: 7.5 G/DL (ref 6.4–8.2)
RBC # BLD AUTO: 4.85 X10*6/UL (ref 4.5–5.9)
RBC MORPH BLD: NORMAL
SODIUM SERPL-SCNC: 139 MMOL/L (ref 136–145)
WBC # BLD AUTO: 7.7 X10*3/UL (ref 4.4–11.3)

## 2025-05-03 PROCEDURE — 70498 CT ANGIOGRAPHY NECK: CPT | Performed by: STUDENT IN AN ORGANIZED HEALTH CARE EDUCATION/TRAINING PROGRAM

## 2025-05-03 PROCEDURE — 84484 ASSAY OF TROPONIN QUANT: CPT | Performed by: NURSE PRACTITIONER

## 2025-05-03 PROCEDURE — 99285 EMERGENCY DEPT VISIT HI MDM: CPT | Mod: 25 | Performed by: STUDENT IN AN ORGANIZED HEALTH CARE EDUCATION/TRAINING PROGRAM

## 2025-05-03 PROCEDURE — 2500000004 HC RX 250 GENERAL PHARMACY W/ HCPCS (ALT 636 FOR OP/ED): Mod: JZ | Performed by: NURSE PRACTITIONER

## 2025-05-03 PROCEDURE — 96374 THER/PROPH/DIAG INJ IV PUSH: CPT

## 2025-05-03 PROCEDURE — 83735 ASSAY OF MAGNESIUM: CPT | Performed by: NURSE PRACTITIONER

## 2025-05-03 PROCEDURE — 71045 X-RAY EXAM CHEST 1 VIEW: CPT | Mod: FOREIGN READ | Performed by: RADIOLOGY

## 2025-05-03 PROCEDURE — 96361 HYDRATE IV INFUSION ADD-ON: CPT

## 2025-05-03 PROCEDURE — 85025 COMPLETE CBC W/AUTO DIFF WBC: CPT | Performed by: NURSE PRACTITIONER

## 2025-05-03 PROCEDURE — 80053 COMPREHEN METABOLIC PANEL: CPT | Performed by: NURSE PRACTITIONER

## 2025-05-03 PROCEDURE — 93005 ELECTROCARDIOGRAM TRACING: CPT

## 2025-05-03 PROCEDURE — 36415 COLL VENOUS BLD VENIPUNCTURE: CPT | Performed by: NURSE PRACTITIONER

## 2025-05-03 PROCEDURE — 71275 CT ANGIOGRAPHY CHEST: CPT

## 2025-05-03 PROCEDURE — 2550000001 HC RX 255 CONTRASTS: Performed by: NURSE PRACTITIONER

## 2025-05-03 PROCEDURE — 2500000004 HC RX 250 GENERAL PHARMACY W/ HCPCS (ALT 636 FOR OP/ED): Mod: JZ | Performed by: STUDENT IN AN ORGANIZED HEALTH CARE EDUCATION/TRAINING PROGRAM

## 2025-05-03 PROCEDURE — 71275 CT ANGIOGRAPHY CHEST: CPT | Mod: FOREIGN READ | Performed by: RADIOLOGY

## 2025-05-03 PROCEDURE — 96376 TX/PRO/DX INJ SAME DRUG ADON: CPT | Mod: 59

## 2025-05-03 PROCEDURE — 71045 X-RAY EXAM CHEST 1 VIEW: CPT

## 2025-05-03 PROCEDURE — 70498 CT ANGIOGRAPHY NECK: CPT

## 2025-05-03 PROCEDURE — 83880 ASSAY OF NATRIURETIC PEPTIDE: CPT | Performed by: NURSE PRACTITIONER

## 2025-05-03 RX ORDER — MORPHINE SULFATE 2 MG/ML
2 INJECTION, SOLUTION INTRAMUSCULAR; INTRAVENOUS ONCE
Status: COMPLETED | OUTPATIENT
Start: 2025-05-03 | End: 2025-05-03

## 2025-05-03 RX ADMIN — SODIUM CHLORIDE, SODIUM LACTATE, POTASSIUM CHLORIDE, AND CALCIUM CHLORIDE 500 ML: .6; .31; .03; .02 INJECTION, SOLUTION INTRAVENOUS at 14:49

## 2025-05-03 RX ADMIN — IOHEXOL 75 ML: 350 INJECTION, SOLUTION INTRAVENOUS at 14:17

## 2025-05-03 RX ADMIN — MORPHINE SULFATE 2 MG: 2 INJECTION, SOLUTION INTRAMUSCULAR; INTRAVENOUS at 12:25

## 2025-05-03 RX ADMIN — MORPHINE SULFATE 2 MG: 2 INJECTION, SOLUTION INTRAMUSCULAR; INTRAVENOUS at 14:30

## 2025-05-03 ASSESSMENT — PAIN SCALES - GENERAL
PAINLEVEL_OUTOF10: 8

## 2025-05-03 ASSESSMENT — HEART SCORE
ECG: NON-SPECIFIC REPOLARIZATION DISTURBANCE
HISTORY: SLIGHTLY SUSPICIOUS
HEART SCORE: 3
AGE: <45
RISK FACTORS: >2 RISK FACTORS OR HX OF ATHEROSCLEROTIC DISEASE
TROPONIN: LESS THAN OR EQUAL TO NORMAL LIMIT

## 2025-05-03 ASSESSMENT — PAIN - FUNCTIONAL ASSESSMENT
PAIN_FUNCTIONAL_ASSESSMENT: 0-10

## 2025-05-03 ASSESSMENT — LIFESTYLE VARIABLES
HAVE YOU EVER FELT YOU SHOULD CUT DOWN ON YOUR DRINKING: NO
HAVE PEOPLE ANNOYED YOU BY CRITICIZING YOUR DRINKING: NO
EVER HAD A DRINK FIRST THING IN THE MORNING TO STEADY YOUR NERVES TO GET RID OF A HANGOVER: NO
EVER FELT BAD OR GUILTY ABOUT YOUR DRINKING: NO
TOTAL SCORE: 0

## 2025-05-03 ASSESSMENT — COLUMBIA-SUICIDE SEVERITY RATING SCALE - C-SSRS
6. HAVE YOU EVER DONE ANYTHING, STARTED TO DO ANYTHING, OR PREPARED TO DO ANYTHING TO END YOUR LIFE?: NO
1. IN THE PAST MONTH, HAVE YOU WISHED YOU WERE DEAD OR WISHED YOU COULD GO TO SLEEP AND NOT WAKE UP?: NO
2. HAVE YOU ACTUALLY HAD ANY THOUGHTS OF KILLING YOURSELF?: NO

## 2025-05-03 ASSESSMENT — PAIN DESCRIPTION - LOCATION: LOCATION: CHEST

## 2025-05-03 NOTE — ED TRIAGE NOTES
PT PRESENTS TO ED VIA EMS FROM DIALYSIS FOR RIGHT SIDED CHEST PAIN AROUND HIS DIALYSIS CATHETER. PT STATES PAIN PROBABLY STARTED A FEW DAYS AGO  BUT HE DIDN'T NOTICE. PT WAS RECEIVING DIALYSIS TODAY AND STATES PAIN GOT WORSE DURING TREATMENT . PAIN STARTED TO RADIATED UP INTO HIS NECK. PT RECEIVED ONLY HALF OF HIS TREATMENT. PT RECEIVES DIALYSIS M/W/F. PT MISSED TREATMENT YESTERDAY DUE TO BEING SCHEDULED FOR A ROUTINE HEART CATH AT [facility]. PT DID NOT RECEIVE HEART CATH. PT HAD HEART TRANSPLANT IN 2009 WITH RECENT BYPASS .

## 2025-05-03 NOTE — ED PROVIDER NOTES
Emergency Department Provider Note        History of Present Illness     History provided by: Patient  Limitations to History: None  External Records Reviewed with Brief Summary: [facility] admission on 4- he was worked up for his chest pain.  Including echo which was stable from previous.    HPI:  Jerzy Richard is a 24 y.o. male with multiple comorbidities including ESRD, previous heart transplant, immunosuppression, hypertension, hyperlipidemia, cardiac allograft vasculopathy presents emergency department today from dialysis.  Patient was at dialysis and started having chest pain near the catheter site that was radiated to his neck.  Patient states that he made it through half of his dialysis session but had to stop.  He missed dialysis yesterday as he went for a cardiac catheterization that he ended up not having yesterday at the [place].  Patient denies any shortness of breath, fevers, congestion, or cough.    Physical Exam   Triage vitals:  T 36.1 °C (97 °F)  HR 95  /80  RR (!) 30  O2 100 % None (Room air)    Physical Exam  Vitals and nursing note reviewed.   Constitutional:       General: He is not in acute distress.     Appearance: He is well-developed.   HENT:      Head: Normocephalic and atraumatic.   Eyes:      Conjunctiva/sclera: Conjunctivae normal.   Neck:     Cardiovascular:      Rate and Rhythm: Normal rate and regular rhythm.      Heart sounds: No murmur heard.  Pulmonary:      Effort: Pulmonary effort is normal. No respiratory distress.      Breath sounds: Normal breath sounds.   Chest:      Chest wall: Tenderness and crepitus present.   Abdominal:      Palpations: Abdomen is soft.      Tenderness: There is no abdominal tenderness.   Musculoskeletal:         General: No swelling.      Cervical back: Normal range of motion and neck supple.   Skin:     General: Skin is warm and dry.      Capillary Refill: Capillary refill takes less than 2 seconds.   Neurological:      Mental  Status: He is alert.   Psychiatric:         Mood and Affect: Mood normal.          Medical Decision Making & ED Course   Medical Decision Makin y.o. male with multiple comorbidities including ESRD, previous heart transplant, immunosuppression, hypertension, hyperlipidemia, cardiac allograft vasculopathy presents emergency department today from dialysis.  Patient was at dialysis and started having chest pain near the catheter site that was radiated to his neck.  Patient states that he made it through half of his dialysis session but had to stop.  He missed dialysis yesterday as he went for a cardiac catheterization that he ended up not having yesterday at the [facility].  Patient denies any shortness of breath, fevers, congestion, or cough.  Given patient's irritation to the dialysis catheter and elevated heart rate we did opt to obtain CT imaging of his neck and chest.  These were negative for thrombus or PE.  Essentially stable CT imaging and patient's cardiac workup showed no elevation in troponin and repeat was also negative.  He was recently admitted for cardiac workup and is awaiting outpatient catheterization.  Patient's remaining lab work does show him to have a slight elevated GFR consistent with his end-stage renal disease on dialysis.  Given the fact that we had to dilate the patient I did reach out to the nephrologist on-call Dr. Jackson, who states that this patient does not require admission for emergent dialysis and can get his normal Monday dialysis.  Patient was given 2 doses of IV morphine which did seem to relieve his pain.  He will be discharged home with close follow-up with his PCP.  Strict return precautions were discussed with patient he verbalized understanding these.  ----  Scoring Tools Utilized: HEART Score: 3       Differential diagnoses considered include but are not limited to: ACS, NSTEMI, arrhythmia, STEMI, heart failure, electrolyte derangement, pneumonia, PE, anemia,  dialysis catheter complication     Social Determinants of Health which Significantly Impact Care: None identified     EKG Independent Interpretation: EKG interpreted by myself. Please see ED Course for full interpretation.    Independent Result Review and Interpretation: Relevant laboratory and radiographic results were reviewed and independently interpreted by myself.  As necessary, they are commented on in the ED Course.    Chronic conditions affecting the patient's care: As documented above in [facility]    The patient was discussed with the following consultants/services: None    Care Considerations: As documented above in [facility]    ED Course:  ED Course as of 05/03/25 1545   Sat May 03, 2025   1227 ECG 12 Lead  EKG, my read, 1146  Normal sinus rhythm, short NE interval, no acute ST elevation or depression.  Ventricular rate-93 BPM  T wave inversions are present in precordial leads, upon review, these are present on most recent EKG from 4/26/2025, when compared with ECG, no significant changes found. [WS]   1307 XR chest 1 view  No acute radiographic chest finding. [WS]   1307 CBC and Auto Differential(!)  No leukocytosis, improving anemia, platelets are normal. [WS]   1313 Magnesium  Normal [WS]   1313 Comprehensive Metabolic Panel(!)  No electrolyte maladies, creatinine slightly elevated consistent with recent dialysis, no evidence of elevation in liver enzymes concerning for liver pathology. [WS]   1322 Troponin I Series, High Sensitivity (0, 1 HR)  Elevated, will be repeated [WS]   1322 B-Type Natriuretic Peptide(!)  Slightly elevated however he is having no shortness of breath or extremity swelling, [WS]   1451 CT angio neck  1.   Right internal jugular vein catheter without evidence of  thrombosis.  2. Limited evaluation of the arterial vasculature due to poor  opacification. Within limits of evaluation, no evidence of  significant stenosis or thrombosis.   [WS]   1523 Troponin I Series, High Sensitivity (0, 1  HR)  Repeat also not elevated. [WS]   1524 CT angio chest for pulmonary embolism  1.  No evidence for pulmonary embolus or acute lung parenchymal  process.  2.  Right IJ central venous catheter in place with distal tip at the  cavoatrial junction.  3.  No evidence for focal fluid collections or abscess in the chest  wall.   [WS]   1542 I did reach out to nephrology Dr. Jackson concerning dye load for this patient despite being on dialysis to verify that he should not require emergent dialysis, she states it is ok to discharge the patient and he can get his regularly scheduled Monday dialysis. [WS]      ED Course User Index  [WS] EHSAN Lombardo         Diagnoses as of 05/03/25 1545   Right-sided chest wall pain   Other complication of vascular dialysis catheter, initial encounter     Disposition   As a result of the work-up, the patient was discharged home.  he was informed of his diagnosis and instructed to come back with any concerns or worsening of condition.  he and was agreeable to the plan as discussed above.  he was given the opportunity to ask questions.  All of the patient's questions were answered.    Procedures   Procedures    This was a shared visit with an ED attending.  The patient was seen and discussed with the ED attending    EHSAN Lombardo  Emergency Medicine     EHSAN Lombardo  05/03/25 6915

## 2025-05-09 LAB
ATRIAL RATE: 94 BPM
P AXIS: 87 DEGREES
PR INTERVAL: 106 MS
Q ONSET: 254 MS
QRS COUNT: 15 BEATS
QRS DURATION: 99 MS
QT INTERVAL: 335 MS
QTC CALCULATION(BAZETT): 417 MS
QTC FREDERICIA: 388 MS
R AXIS: 78 DEGREES
T AXIS: 258 DEGREES
T OFFSET: 421 MS
VENTRICULAR RATE: 93 BPM